# Patient Record
Sex: MALE | Race: WHITE | NOT HISPANIC OR LATINO | URBAN - METROPOLITAN AREA
[De-identification: names, ages, dates, MRNs, and addresses within clinical notes are randomized per-mention and may not be internally consistent; named-entity substitution may affect disease eponyms.]

---

## 2024-10-14 ENCOUNTER — LAB REQUISITION (OUTPATIENT)
Dept: LAB | Facility: HOSPITAL | Age: 24
End: 2024-10-14
Payer: COMMERCIAL

## 2024-10-14 DIAGNOSIS — K81.1 CHRONIC CHOLECYSTITIS: ICD-10-CM

## 2024-10-14 PROCEDURE — 88304 TISSUE EXAM BY PATHOLOGIST: CPT

## 2024-10-21 LAB
LABORATORY COMMENT REPORT: NORMAL
PATH REPORT.FINAL DX SPEC: NORMAL
PATH REPORT.GROSS SPEC: NORMAL
PATH REPORT.TOTAL CANCER: NORMAL

## 2025-01-30 ENCOUNTER — OFFICE VISIT (OUTPATIENT)
Dept: URGENT CARE | Age: 25
End: 2025-01-30
Payer: COMMERCIAL

## 2025-01-30 VITALS
SYSTOLIC BLOOD PRESSURE: 122 MMHG | TEMPERATURE: 98 F | RESPIRATION RATE: 16 BRPM | OXYGEN SATURATION: 98 % | HEART RATE: 77 BPM | DIASTOLIC BLOOD PRESSURE: 82 MMHG

## 2025-01-30 DIAGNOSIS — K52.9 GASTROENTERITIS: Primary | ICD-10-CM

## 2025-01-30 DIAGNOSIS — G43.809 OTHER MIGRAINE WITHOUT STATUS MIGRAINOSUS, NOT INTRACTABLE: ICD-10-CM

## 2025-01-30 RX ORDER — METHYLPREDNISOLONE 4 MG/1
TABLET ORAL
Qty: 21 TABLET | Refills: 0 | Status: SHIPPED | OUTPATIENT
Start: 2025-01-30 | End: 2025-02-05

## 2025-01-30 RX ORDER — DICYCLOMINE HYDROCHLORIDE 10 MG/1
10 CAPSULE ORAL 3 TIMES DAILY
Qty: 42 CAPSULE | Refills: 0 | Status: SHIPPED | OUTPATIENT
Start: 2025-01-30 | End: 2025-02-13

## 2025-01-30 NOTE — PROGRESS NOTES
Subjective   Patient ID: Brian Ashton is a 24 y.o. male. They present today with a chief complaint of Headache (Vomiting/headache began on Monday/Vomiting improved, c/o HA, stomach cramping and some vision issues with headache).    History of Present Illness  HPI Brian Ashton is a 24 y.o. male. They present today with a chief complaint of Headache (Vomiting/headache began on Monday/Vomiting improved, c/o HA, stomach cramping and some vision issues with headache).  Patient reports having the symptoms since Monday of this week.  He reports that his only GI history was a cholecystectomy back in October of last year.  He reports that she is has soft stool since then however has gotten better.  In the last 3 days, his symptoms of loose stools restarted again.  He also reports head pain after vomiting.  He states that he has vomited 6 times since Monday.  He now reports that the head pain continues to persist right with vomiting episodes.  He has tried Tylenol and ibuprofen with minimal relief.  He is here for further evaluation and health maintenance.    Past Medical History  Allergies as of 01/30/2025    (No Known Allergies)       (Not in a hospital admission)       Past Medical History:   Diagnosis Date    Other specified health status     No pertinent past medical history    Personal history of other drug therapy     COVID-19 vaccine series completed       Past Surgical History:   Procedure Laterality Date    OTHER SURGICAL HISTORY  02/08/2022    No history of surgery            Review of Systems  Review of Systems  Head pain, abdominal pain, nausea, vomiting    Objective    Vitals:    01/30/25 1247   BP: 122/82   Pulse: 77   Resp: 16   Temp: 36.7 °C (98 °F)   SpO2: 98%     No LMP for male patient.    Physical Exam  Unremarkable  Procedures    Point of Care Test & Imaging Results from this visit  No results found for this visit on 01/30/25.   No results found.    Diagnostic study results (if any) were reviewed by Chintan  R Jusran, APRN-CNP.    Assessment/Plan   Allergies, medications, history, and pertinent labs/EKGs/Imaging reviewed by INDIA Grimaldo.     Medical Decision Making  Upon initial assessment, the patient was sitting calmly in the bedside chair in no acute/respiratory distress.  Entire physical examination is essentially unremarkable.  Patient is neurovascularly intact with no abnormalities.  Abdominal exam reveals no pain or tenderness upon palpation, guarding with a soft and nondistended abdomen with normal active bowel sounds.  Given his symptoms, this is likely viral gastroenteritis with a migraine.  I did send him home with Bentyl and a Medrol Dosepak.  Over-the-counter Tylenol and Tums.  If symptoms worsen, head to the emergency department.  Patient agrees to plan of care was discharged stable condition.    As a result of the work-up, the patient was discharged home.  he was informed of his diagnosis and instructed to come back with any concerns or worsening of condition.  he and was agreeable to the plan as discussed above.  he was given the opportunity to ask questions.  All of the patient's questions were answered.    This document was generated using the assistance of voice recognition software. If there are any errors of spelling, grammar, syntax, or meaning; please feel free to contact me directly for clarification.     Orders and Diagnoses  Diagnoses and all orders for this visit:  Gastroenteritis  -     dicyclomine (Bentyl) 10 mg capsule; Take 1 capsule (10 mg) by mouth 3 times a day for 14 days.  Other migraine without status migrainosus, not intractable  -     methylPREDNISolone (Medrol Dospak) 4 mg tablets; Take as directed on package.      Medical Admin Record      Patient disposition: Home    Electronically signed by INDIA Grimaldo  1:05 PM